# Patient Record
Sex: FEMALE | Race: WHITE | NOT HISPANIC OR LATINO
[De-identification: names, ages, dates, MRNs, and addresses within clinical notes are randomized per-mention and may not be internally consistent; named-entity substitution may affect disease eponyms.]

---

## 2023-01-04 ENCOUNTER — APPOINTMENT (OUTPATIENT)
Dept: OBGYN | Facility: CLINIC | Age: 38
End: 2023-01-04
Payer: COMMERCIAL

## 2023-01-04 VITALS
DIASTOLIC BLOOD PRESSURE: 67 MMHG | WEIGHT: 206 LBS | HEART RATE: 87 BPM | SYSTOLIC BLOOD PRESSURE: 110 MMHG | BODY MASS INDEX: 32.33 KG/M2 | HEIGHT: 67 IN

## 2023-01-04 DIAGNOSIS — Z87.448 PERSONAL HISTORY OF OTHER DISEASES OF URINARY SYSTEM: ICD-10-CM

## 2023-01-04 DIAGNOSIS — Z00.00 ENCOUNTER FOR GENERAL ADULT MEDICAL EXAMINATION W/OUT ABNORMAL FINDINGS: ICD-10-CM

## 2023-01-04 DIAGNOSIS — K70.31 ALCOHOLIC CIRRHOSIS OF LIVER WITH ASCITES: ICD-10-CM

## 2023-01-04 PROCEDURE — 99385 PREV VISIT NEW AGE 18-39: CPT

## 2023-01-04 NOTE — PLAN
[FreeTextEntry1] : Patient is a 37-year-old  6 para 1-0-5-1 last menstrual period 2021\par Patient presents as a first visit after not being seen for almost 5 years and is scheduled for possible liver and kidney transplant.\par Physical exam reveals a well-developed well-nourished female,,, BMI 32\par Heart regular rhythm and rate, lungs clear, breast no mass nontender no skin lesion or nipple discharge no adenopathy, abdomen soft diffuse ascites unable to assess for organomegaly\par Pelvic exam shows normal female external genitalia slightly atrophic, vagina no lesions slightly atrophic, cervix appropriate size nontender, uterus adnexa unable to be assessed secondary to patient's body habitus from the ascites\par Pepcid was performed\par Patient will be scheduled for pelvic sonogram to further evaluate and assess pelvic anatomy.\par Patient does state she was diagnosed with COVID back in ,,, denies any residual symptoms or deficits,,, states she has been vaccinated and boosted\par \par \par Past medical history liver cirrhosis renal,,, ascites\par Menstrual history,,,  x1, cholecystectomy, D&C x3, oral surgery, and peritoneal taps for ascites on a weekly basis\par Allergies,,, iodine\par Medications,,, lactulose, rifaximin, Lexapro, Carafate, serum bicarbonate, potassium,\par Past OB history,,,  x1, TOPV x1,, spontaneous AB x4\par Past GYN history,,, menarche at age 10,,, interval 28, duration 5 days,,, patient has history of birth control pill use, PID, STDs, does have history of positive HPV\par Tobacco,,, denies,\par Alcohol, calm, history of alcoholism,,, alcohol 3 x 1 year\par Drugs,,, marijuana\par Family history,,, mother alive with a history of Hodgkin's disease,,, father alive and well,,, patient denies any family history of breast or ovarian cancer

## 2023-01-04 NOTE — PHYSICAL EXAM
[Chaperone Present] : A chaperone was present in the examining room during all aspects of the physical examination [Appropriately responsive] : appropriately responsive [Alert] : alert [No Acute Distress] : no acute distress [No Lymphadenopathy] : no lymphadenopathy [Regular Rate Rhythm] : regular rate rhythm [No Murmurs] : no murmurs [Clear to Auscultation B/L] : clear to auscultation bilaterally [Oriented x3] : oriented x3 [Examination Of The Breasts] : a normal appearance [No Masses] : no breast masses were palpable [Vulvar Atrophy] : vulvar atrophy [Labia Majora] : normal [Labia Minora] : normal [Atrophy] : atrophy [Normal] : normal [FreeTextEntry7] : Diffuse ascites.,,,,  Unable to assess organs [FreeTextEntry6] : Unable to assess uterus size or adnexa masses due to diffuse ascites

## 2023-01-04 NOTE — HISTORY OF PRESENT ILLNESS
[FreeTextEntry1] : Patient is a 37-year-old  6 para 1-0-5-1 last menstrual period in 2021\par Patient presents for initial visit after not being seen for over 5 years and also for evaluation and clearance for a liver kidney transplant to be scheduled in the near future

## 2023-01-09 ENCOUNTER — APPOINTMENT (OUTPATIENT)
Dept: OBGYN | Facility: CLINIC | Age: 38
End: 2023-01-09
Payer: COMMERCIAL

## 2023-01-09 ENCOUNTER — ASOB RESULT (OUTPATIENT)
Age: 38
End: 2023-01-09

## 2023-01-09 VITALS
HEIGHT: 67 IN | SYSTOLIC BLOOD PRESSURE: 116 MMHG | BODY MASS INDEX: 32.33 KG/M2 | DIASTOLIC BLOOD PRESSURE: 81 MMHG | HEART RATE: 76 BPM | WEIGHT: 206 LBS

## 2023-01-09 PROCEDURE — 76830 TRANSVAGINAL US NON-OB: CPT

## 2023-01-09 PROCEDURE — 99213 OFFICE O/P EST LOW 20 MIN: CPT

## 2023-01-09 PROCEDURE — 93976 VASCULAR STUDY: CPT

## 2023-01-09 NOTE — HISTORY OF PRESENT ILLNESS
[FreeTextEntry1] : Patient is a 37-year-old  6 para 1-0-5-1 last menstrual period 2021\par Patient presents for sonographic duration of anatomy patient is scheduled for possible liver kidney transplant

## 2023-01-09 NOTE — PLAN
[FreeTextEntry1] : Patient is a 37-year-old  6 para 1-0-5-1 last menstrual period t\par Patient presents for evaluation and clearance from a GYN perspective for possible liver kidney transplant\par Patient was seen on  of this year thousand 23 for full exam\par Pelvic sonogram\par Uterus 4.23 x 5.01 x 3.53\par Cervical length 1.9 cm\par Endometrial thickness 3.8 mm\par Right ovary 2.04 x 2.02 x 2.26\par Left ovary 1.89 x 2.19 x 2.7 with a small cyst measuring 0.48 x 0.51 x 0.32 with a normal color Doppler flow no evidence of torsion\par Ascites visualized throughout the pelvis and adnexa and cul-de-sac\par Pelvic anatomy within normal limits and findings discussed with the patient\par Patient cleared from a GYN perspective for any further treatment and surgical procedures\par \par Essentially benign GYN anatomy and findings\par HPV results are positive\par Pap smear results still pending\par Follow-up 1 year or prior to that as needed

## 2023-01-15 LAB
CYTOLOGY CVX/VAG DOC THIN PREP: ABNORMAL
HPV HIGH+LOW RISK DNA PNL CVX: DETECTED

## 2023-01-17 ENCOUNTER — NON-APPOINTMENT (OUTPATIENT)
Age: 38
End: 2023-01-17

## 2024-03-05 ENCOUNTER — APPOINTMENT (OUTPATIENT)
Dept: OBGYN | Facility: CLINIC | Age: 39
End: 2024-03-05
Payer: COMMERCIAL

## 2024-03-05 ENCOUNTER — ASOB RESULT (OUTPATIENT)
Age: 39
End: 2024-03-05

## 2024-03-05 VITALS
WEIGHT: 180 LBS | DIASTOLIC BLOOD PRESSURE: 74 MMHG | SYSTOLIC BLOOD PRESSURE: 101 MMHG | HEART RATE: 89 BPM | BODY MASS INDEX: 28.19 KG/M2

## 2024-03-05 DIAGNOSIS — B97.7 PAPILLOMAVIRUS AS THE CAUSE OF DISEASES CLASSIFIED ELSEWHERE: ICD-10-CM

## 2024-03-05 DIAGNOSIS — Z01.419 ENCOUNTER FOR GYNECOLOGICAL EXAMINATION (GENERAL) (ROUTINE) W/OUT ABNORMAL FINDINGS: ICD-10-CM

## 2024-03-05 DIAGNOSIS — Z12.31 ENCOUNTER FOR SCREENING MAMMOGRAM FOR MALIGNANT NEOPLASM OF BREAST: ICD-10-CM

## 2024-03-05 DIAGNOSIS — R92.30 DENSE BREASTS, UNSPECIFIED: ICD-10-CM

## 2024-03-05 DIAGNOSIS — N92.6 IRREGULAR MENSTRUATION, UNSPECIFIED: ICD-10-CM

## 2024-03-05 DIAGNOSIS — Z12.39 ENCOUNTER FOR OTHER SCREENING FOR MALIGNANT NEOPLASM OF BREAST: ICD-10-CM

## 2024-03-05 DIAGNOSIS — Z12.4 ENCOUNTER FOR SCREENING FOR MALIGNANT NEOPLASM OF CERVIX: ICD-10-CM

## 2024-03-05 DIAGNOSIS — Z11.51 ENCOUNTER FOR SCREENING FOR HUMAN PAPILLOMAVIRUS (HPV): ICD-10-CM

## 2024-03-05 DIAGNOSIS — U07.1 COVID-19: ICD-10-CM

## 2024-03-05 PROCEDURE — 76830 TRANSVAGINAL US NON-OB: CPT

## 2024-03-05 PROCEDURE — 76856 US EXAM PELVIC COMPLETE: CPT | Mod: 59

## 2024-03-05 PROCEDURE — 99396 PREV VISIT EST AGE 40-64: CPT

## 2024-03-08 ENCOUNTER — NON-APPOINTMENT (OUTPATIENT)
Age: 39
End: 2024-03-08

## 2024-03-08 LAB — HPV HIGH+LOW RISK DNA PNL CVX: NOT DETECTED

## 2024-03-14 ENCOUNTER — APPOINTMENT (OUTPATIENT)
Dept: OBGYN | Facility: CLINIC | Age: 39
End: 2024-03-14

## 2024-03-28 DIAGNOSIS — A63.0 ANOGENITAL (VENEREAL) WARTS: ICD-10-CM

## 2024-06-05 ENCOUNTER — OUTPATIENT (OUTPATIENT)
Dept: OUTPATIENT SERVICES | Facility: HOSPITAL | Age: 39
LOS: 1 days | End: 2024-06-05
Payer: COMMERCIAL

## 2024-06-05 VITALS
TEMPERATURE: 97 F | HEIGHT: 67 IN | HEART RATE: 79 BPM | SYSTOLIC BLOOD PRESSURE: 120 MMHG | WEIGHT: 191.58 LBS | OXYGEN SATURATION: 98 % | RESPIRATION RATE: 18 BRPM | DIASTOLIC BLOOD PRESSURE: 90 MMHG

## 2024-06-05 DIAGNOSIS — Z01.818 ENCOUNTER FOR OTHER PREPROCEDURAL EXAMINATION: ICD-10-CM

## 2024-06-05 DIAGNOSIS — Z98.890 OTHER SPECIFIED POSTPROCEDURAL STATES: Chronic | ICD-10-CM

## 2024-06-05 DIAGNOSIS — Z13.89 ENCOUNTER FOR SCREENING FOR OTHER DISORDER: ICD-10-CM

## 2024-06-05 DIAGNOSIS — A63.0 ANOGENITAL (VENEREAL) WARTS: ICD-10-CM

## 2024-06-05 DIAGNOSIS — Z90.49 ACQUIRED ABSENCE OF OTHER SPECIFIED PARTS OF DIGESTIVE TRACT: Chronic | ICD-10-CM

## 2024-06-05 DIAGNOSIS — Z29.9 ENCOUNTER FOR PROPHYLACTIC MEASURES, UNSPECIFIED: ICD-10-CM

## 2024-06-05 DIAGNOSIS — D64.9 ANEMIA, UNSPECIFIED: Chronic | ICD-10-CM

## 2024-06-05 DIAGNOSIS — Z98.891 HISTORY OF UTERINE SCAR FROM PREVIOUS SURGERY: Chronic | ICD-10-CM

## 2024-06-05 DIAGNOSIS — Z94.0 KIDNEY TRANSPLANT STATUS: Chronic | ICD-10-CM

## 2024-06-05 DIAGNOSIS — Z94.4 LIVER TRANSPLANT STATUS: Chronic | ICD-10-CM

## 2024-06-05 LAB
A1C WITH ESTIMATED AVERAGE GLUCOSE RESULT: 4.4 % — SIGNIFICANT CHANGE UP (ref 4–5.6)
ALBUMIN SERPL ELPH-MCNC: 4.4 G/DL — SIGNIFICANT CHANGE UP (ref 3.3–5.2)
ALP SERPL-CCNC: 138 U/L — HIGH (ref 40–120)
ALT FLD-CCNC: <5 U/L — SIGNIFICANT CHANGE UP
ANION GAP SERPL CALC-SCNC: 12 MMOL/L — SIGNIFICANT CHANGE UP (ref 5–17)
ANISOCYTOSIS BLD QL: SLIGHT — SIGNIFICANT CHANGE UP
APTT BLD: 35.3 SEC — SIGNIFICANT CHANGE UP (ref 24.5–35.6)
AST SERPL-CCNC: 12 U/L — SIGNIFICANT CHANGE UP
BASOPHILS # BLD AUTO: 0 K/UL — SIGNIFICANT CHANGE UP (ref 0–0.2)
BASOPHILS NFR BLD AUTO: 0 % — SIGNIFICANT CHANGE UP (ref 0–2)
BILIRUB SERPL-MCNC: 0.4 MG/DL — SIGNIFICANT CHANGE UP (ref 0.4–2)
BLD GP AB SCN SERPL QL: SIGNIFICANT CHANGE UP
BUN SERPL-MCNC: 15.9 MG/DL — SIGNIFICANT CHANGE UP (ref 8–20)
CALCIUM SERPL-MCNC: 8.7 MG/DL — SIGNIFICANT CHANGE UP (ref 8.4–10.5)
CHLORIDE SERPL-SCNC: 105 MMOL/L — SIGNIFICANT CHANGE UP (ref 96–108)
CO2 SERPL-SCNC: 23 MMOL/L — SIGNIFICANT CHANGE UP (ref 22–29)
CREAT SERPL-MCNC: 1.19 MG/DL — SIGNIFICANT CHANGE UP (ref 0.5–1.3)
CRP SERPL-MCNC: 6 MG/L — HIGH
EGFR: 59 ML/MIN/1.73M2 — LOW
ELLIPTOCYTES BLD QL SMEAR: SLIGHT — SIGNIFICANT CHANGE UP
EOSINOPHIL # BLD AUTO: 0 K/UL — SIGNIFICANT CHANGE UP (ref 0–0.5)
EOSINOPHIL NFR BLD AUTO: 0 % — SIGNIFICANT CHANGE UP (ref 0–6)
ESTIMATED AVERAGE GLUCOSE: 80 MG/DL — SIGNIFICANT CHANGE UP (ref 68–114)
FERRITIN SERPL-MCNC: 167 NG/ML — HIGH (ref 15–150)
FOLATE SERPL-MCNC: 7.5 NG/ML — SIGNIFICANT CHANGE UP
GIANT PLATELETS BLD QL SMEAR: PRESENT — SIGNIFICANT CHANGE UP
GLUCOSE SERPL-MCNC: 83 MG/DL — SIGNIFICANT CHANGE UP (ref 70–99)
HCG SERPL-ACNC: <4 MIU/ML — SIGNIFICANT CHANGE UP
HCT VFR BLD CALC: 34.6 % — SIGNIFICANT CHANGE UP (ref 34.5–45)
HGB BLD-MCNC: 11 G/DL — LOW (ref 11.5–15.5)
INR BLD: 1.07 RATIO — SIGNIFICANT CHANGE UP (ref 0.85–1.18)
IRON SATN MFR SERPL: 38 % — SIGNIFICANT CHANGE UP (ref 14–50)
IRON SATN MFR SERPL: 95 UG/DL — SIGNIFICANT CHANGE UP (ref 37–145)
LYMPHOCYTES # BLD AUTO: 0.57 K/UL — LOW (ref 1–3.3)
LYMPHOCYTES # BLD AUTO: 18 % — SIGNIFICANT CHANGE UP (ref 13–44)
MANUAL SMEAR VERIFICATION: SIGNIFICANT CHANGE UP
MCHC RBC-ENTMCNC: 28.9 PG — SIGNIFICANT CHANGE UP (ref 27–34)
MCHC RBC-ENTMCNC: 31.8 GM/DL — LOW (ref 32–36)
MCV RBC AUTO: 91.1 FL — SIGNIFICANT CHANGE UP (ref 80–100)
MICROCYTES BLD QL: SLIGHT — SIGNIFICANT CHANGE UP
MONOCYTES # BLD AUTO: 0.31 K/UL — SIGNIFICANT CHANGE UP (ref 0–0.9)
MONOCYTES NFR BLD AUTO: 9.9 % — SIGNIFICANT CHANGE UP (ref 2–14)
NEUTROPHILS # BLD AUTO: 2.14 K/UL — SIGNIFICANT CHANGE UP (ref 1.8–7.4)
NEUTROPHILS NFR BLD AUTO: 67.6 % — SIGNIFICANT CHANGE UP (ref 43–77)
OVALOCYTES BLD QL SMEAR: SLIGHT — SIGNIFICANT CHANGE UP
PLAT MORPH BLD: NORMAL — SIGNIFICANT CHANGE UP
PLATELET # BLD AUTO: 180 K/UL — SIGNIFICANT CHANGE UP (ref 150–400)
POIKILOCYTOSIS BLD QL AUTO: SLIGHT — SIGNIFICANT CHANGE UP
POLYCHROMASIA BLD QL SMEAR: SLIGHT — SIGNIFICANT CHANGE UP
POTASSIUM SERPL-MCNC: 4.3 MMOL/L — SIGNIFICANT CHANGE UP (ref 3.5–5.3)
POTASSIUM SERPL-SCNC: 4.3 MMOL/L — SIGNIFICANT CHANGE UP (ref 3.5–5.3)
PROT SERPL-MCNC: 6.5 G/DL — LOW (ref 6.6–8.7)
PROTHROM AB SERPL-ACNC: 11.9 SEC — SIGNIFICANT CHANGE UP (ref 9.5–13)
RBC # BLD: 3.8 M/UL — SIGNIFICANT CHANGE UP (ref 3.8–5.2)
RBC # FLD: 15.6 % — HIGH (ref 10.3–14.5)
RBC BLD AUTO: ABNORMAL
RETICS #: 64.8 K/UL — SIGNIFICANT CHANGE UP (ref 25–125)
RETICS/RBC NFR: 1.7 % — SIGNIFICANT CHANGE UP (ref 0.5–2.5)
SMUDGE CELLS # BLD: PRESENT — SIGNIFICANT CHANGE UP
SODIUM SERPL-SCNC: 140 MMOL/L — SIGNIFICANT CHANGE UP (ref 135–145)
TIBC SERPL-MCNC: 249 UG/DL — SIGNIFICANT CHANGE UP (ref 220–430)
TRANSFERRIN SERPL-MCNC: 174 MG/DL — LOW (ref 192–382)
VARIANT LYMPHS # BLD: 4.5 % — SIGNIFICANT CHANGE UP (ref 0–6)
VIT B12 SERPL-MCNC: 1267 PG/ML — HIGH (ref 232–1245)
WBC # BLD: 3.16 K/UL — LOW (ref 3.8–10.5)
WBC # FLD AUTO: 3.16 K/UL — LOW (ref 3.8–10.5)

## 2024-06-05 PROCEDURE — 83001 ASSAY OF GONADOTROPIN (FSH): CPT

## 2024-06-05 PROCEDURE — G0463: CPT

## 2024-06-05 PROCEDURE — 83540 ASSAY OF IRON: CPT

## 2024-06-05 PROCEDURE — 84466 ASSAY OF TRANSFERRIN: CPT

## 2024-06-05 PROCEDURE — 86140 C-REACTIVE PROTEIN: CPT

## 2024-06-05 PROCEDURE — 82670 ASSAY OF TOTAL ESTRADIOL: CPT

## 2024-06-05 PROCEDURE — 85610 PROTHROMBIN TIME: CPT

## 2024-06-05 PROCEDURE — 83036 HEMOGLOBIN GLYCOSYLATED A1C: CPT

## 2024-06-05 PROCEDURE — 86900 BLOOD TYPING SEROLOGIC ABO: CPT

## 2024-06-05 PROCEDURE — 82607 VITAMIN B-12: CPT

## 2024-06-05 PROCEDURE — 85730 THROMBOPLASTIN TIME PARTIAL: CPT

## 2024-06-05 PROCEDURE — 93010 ELECTROCARDIOGRAM REPORT: CPT

## 2024-06-05 PROCEDURE — 36415 COLL VENOUS BLD VENIPUNCTURE: CPT

## 2024-06-05 PROCEDURE — 83002 ASSAY OF GONADOTROPIN (LH): CPT

## 2024-06-05 PROCEDURE — 93005 ELECTROCARDIOGRAM TRACING: CPT

## 2024-06-05 PROCEDURE — 85025 COMPLETE CBC W/AUTO DIFF WBC: CPT

## 2024-06-05 PROCEDURE — 86901 BLOOD TYPING SEROLOGIC RH(D): CPT

## 2024-06-05 PROCEDURE — 82746 ASSAY OF FOLIC ACID SERUM: CPT

## 2024-06-05 PROCEDURE — 83550 IRON BINDING TEST: CPT

## 2024-06-05 PROCEDURE — 86850 RBC ANTIBODY SCREEN: CPT

## 2024-06-05 PROCEDURE — 82728 ASSAY OF FERRITIN: CPT

## 2024-06-05 PROCEDURE — 84702 CHORIONIC GONADOTROPIN TEST: CPT

## 2024-06-05 PROCEDURE — 85045 AUTOMATED RETICULOCYTE COUNT: CPT

## 2024-06-05 PROCEDURE — 80053 COMPREHEN METABOLIC PANEL: CPT

## 2024-06-05 NOTE — H&P PST ADULT - ASSESSMENT
38 yo F   x1 LMP 2024 prior to that it was 2023 before that it was 2021, presents with c/o irregular menstrual bleeding and evaluation of a vulvar mass. Patient has history of liver and kidney transplant with unfortunately failure of the kidney and now is back on dialysis and awaiting new liver to be transplanted. Preop assessment prior to excision of vulvar mass w/Dr Rodriguez scheduled for 2024, pending medical clearance    Written and oral ERP instructions given, pt verbalized understanding.     Patient was educated on preop preparation with written and verbal instructions. Pt was informed to obtain clearances >3 days before surgery. Pt will review medications with PCP. Pt was educated on NSAIDs, multivitamins and herbals that increase the risk of bleeding and need to be stopped 7 days before procedure. Pt verbalized understanding of the above.     OPIOID RISK TOOL    GABI EACH BOX THAT APPLIES AND ADD TOTALS AT THE END    FAMILY HISTORY OF SUBSTANCE ABUSE                 FEMALE         MALE                                                Alcohol                             [  ]1 pt          [  ]3pts                                               Illegal Durgs                     [  ]2 pts        [  ]3pts                                               Rx Drugs                           [  ]4 pts        [  ]4 pts    PERSONAL HISTORY OF SUBSTANCE ABUSE                                                                                          Alcohol                             [x  ]3 pts       [  ]3 pts                                               Illegal Drugs                     [  ]4 pts        [  ]4 pts                                               Rx Drugs                           [  ]5 pts        [  ]5 pts    AGE BETWEEN 16-45 YEARS                                      [ x ]1 pt         [  ]1 pt    HISTORY OF PREADOLESCENT   SEXUAL ABUSE                                                             [  ]3 pts        [  ]0pts    PSYCHOLOGICAL DISEASE                     ADD, OCD, Bipolar, Schizophrenia        [  ]2 pts         [  ]2 pts                      Depression                                               [ x ]1 pt           [  ]1 pt           SCORING TOTAL   (add numbers and type here)              ( 5 )                                     A score of 3 or lower indicated LOW risk for future opioid abuse  A score of 4 to 7 indicated moderate risk for future opioid abuse  A score of 8 or higher indicates a high risk for opioid abuse    CAPRINI VTE 2.0 SCORE [CLOT updated 2019]    AGE RELATED RISK FACTORS                                                       MOBILITY RELATED FACTORS  [ ] Age 41-60 years                                            (1 Point)                    [ ] Bed rest                                                        (1 Point)  [ ] Age: 61-74 years                                           (2 Points)                  [ ] Plaster cast                                                   (2 Points)  [ ] Age= 75 years                                              (3 Points)                    [ ] Bed bound for more than 72 hours                 (2 Points)    DISEASE RELATED RISK FACTORS                                               GENDER SPECIFIC FACTORS  [ ] Edema in the lower extremities                       (1 Point)              [ ] Pregnancy                                                     (1 Point)  [ ] Varicose veins                                               (1 Point)                     [ ] Post-partum < 6 weeks                                   (1 Point)             [x ] BMI > 25 Kg/m2                                            (1 Point)                     [ ] Hormonal therapy  or oral contraception          (1 Point)                 [ ] Sepsis (in the previous month)                        (1 Point)               [ ] History of pregnancy complications                 (1 point)  [ ] Pneumonia or serious lung disease                                               [ ] Unexplained or recurrent                     (1 Point)           (in the previous month)                               (1 Point)  [ ] Abnormal pulmonary function test                     (1 Point)                 SURGERY RELATED RISK FACTORS  [ ] Acute myocardial infarction                              (1 Point)               [ ]  Section                                             (1 Point)  [ ] Congestive heart failure (in the previous month)  (1 Point)      [ ] Minor surgery                                                  (1 Point)   [ ] Inflammatory bowel disease                             (1 Point)               [ ] Arthroscopic surgery                                        (2 Points)  [ ] Central venous access                                      (2 Points)                [x ] General surgery lasting more than 45 minutes (2 points)  [ ] Malignancy- Present or previous                   (2 Points)                [ ] Elective arthroplasty                                         (5 points)    [ ] Stroke (in the previous month)                          (5 Points)                                                                                                                                                           HEMATOLOGY RELATED FACTORS                                                 TRAUMA RELATED RISK FACTORS  [ ] Prior episodes of VTE                                     (3 Points)                [ ] Fracture of the hip, pelvis, or leg                       (5 Points)  [ ] Positive family history for VTE                         (3 Points)             [ ] Acute spinal cord injury (in the previous month)  (5 Points)  [ ] Prothrombin 97620 A                                     (3 Points)               [ ] Paralysis  (less than 1 month)                             (5 Points)  [ ] Factor V Leiden                                             (3 Points)                  [ ] Multiple Trauma within 1 month                        (5 Points)  [ ] Lupus anticoagulants                                     (3 Points)                                                           [ ] Anticardiolipin antibodies                               (3 Points)                                                       [ ] High homocysteine in the blood                      (3 Points)                                             [ ] Other congenital or acquired thrombophilia      (3 Points)                                                [ ] Heparin induced thrombocytopenia                  (3 Points)                                     Total Score [    3      ] 40 yo F   x1 LMP in late May 2024, PMH of IBS, anemia, anxiety, presents with c/o irregular menstrual bleeding and a vulvar mass. Patient has Hx of liver transplant in 2023 and 2 kidney transplants, most recent done 3/23/2024. Preop assessment prior to excision of vulvar mass w/Dr Rodriguez scheduled for 2024, pending medical clearance    Written and oral ERP instructions given, pt verbalized understanding.     Patient was educated on preop preparation with written and verbal instructions. Pt was informed to obtain clearances >3 days before surgery. Pt will review medications with PCP. Pt was educated on NSAIDs, multivitamins and herbals that increase the risk of bleeding and need to be stopped 7 days before procedure. Pt verbalized understanding of the above.     OPIOID RISK TOOL    GABI EACH BOX THAT APPLIES AND ADD TOTALS AT THE END    FAMILY HISTORY OF SUBSTANCE ABUSE                 FEMALE         MALE                                                Alcohol                             [  ]1 pt          [  ]3pts                                               Illegal Drugs                     [  ]2 pts        [  ]3pts                                               Rx Drugs                           [  ]4 pts        [  ]4 pts    PERSONAL HISTORY OF SUBSTANCE ABUSE                                                                                          Alcohol                             [x  ]3 pts       [  ]3 pts                                               Illegal Drugs                     [  ]4 pts        [  ]4 pts                                               Rx Drugs                           [  ]5 pts        [  ]5 pts    AGE BETWEEN 16-45 YEARS                                      [ x ]1 pt         [  ]1 pt    HISTORY OF PREADOLESCENT   SEXUAL ABUSE                                                             [  ]3 pts        [  ]0pts    PSYCHOLOGICAL DISEASE                     ADD, OCD, Bipolar, Schizophrenia        [  ]2 pts         [  ]2 pts                      Depression                                               [ x ]1 pt           [  ]1 pt           SCORING TOTAL   (add numbers and type here)              ( 5 )                                     A score of 3 or lower indicated LOW risk for future opioid abuse  A score of 4 to 7 indicated moderate risk for future opioid abuse  A score of 8 or higher indicates a high risk for opioid abuse    ORACIOI VTE 2.0 SCORE [CLOT updated 2019]    AGE RELATED RISK FACTORS                                                       MOBILITY RELATED FACTORS  [ ] Age 41-60 years                                            (1 Point)                    [ ] Bed rest                                                        (1 Point)  [ ] Age: 61-74 years                                           (2 Points)                  [ ] Plaster cast                                                   (2 Points)  [ ] Age= 75 years                                              (3 Points)                    [ ] Bed bound for more than 72 hours                 (2 Points)    DISEASE RELATED RISK FACTORS                                               GENDER SPECIFIC FACTORS  [ ] Edema in the lower extremities                       (1 Point)              [ ] Pregnancy                                                     (1 Point)  [ ] Varicose veins                                               (1 Point)                     [ ] Post-partum < 6 weeks                                   (1 Point)             [x ] BMI > 25 Kg/m2                                            (1 Point)                     [ ] Hormonal therapy  or oral contraception          (1 Point)                 [ ] Sepsis (in the previous month)                        (1 Point)               [ ] History of pregnancy complications                 (1 point)  [ ] Pneumonia or serious lung disease                                               [ ] Unexplained or recurrent                     (1 Point)           (in the previous month)                               (1 Point)  [ ] Abnormal pulmonary function test                     (1 Point)                 SURGERY RELATED RISK FACTORS  [ ] Acute myocardial infarction                              (1 Point)               [ ]  Section                                             (1 Point)  [ ] Congestive heart failure (in the previous month)  (1 Point)      [ ] Minor surgery                                                  (1 Point)   [ ] Inflammatory bowel disease                             (1 Point)               [ ] Arthroscopic surgery                                        (2 Points)  [ ] Central venous access                                      (2 Points)                [x ] General surgery lasting more than 45 minutes (2 points)  [ ] Malignancy- Present or previous                   (2 Points)                [ ] Elective arthroplasty                                         (5 points)    [ ] Stroke (in the previous month)                          (5 Points)                                                                                                                                                           HEMATOLOGY RELATED FACTORS                                                 TRAUMA RELATED RISK FACTORS  [ ] Prior episodes of VTE                                     (3 Points)                [ ] Fracture of the hip, pelvis, or leg                       (5 Points)  [ ] Positive family history for VTE                         (3 Points)             [ ] Acute spinal cord injury (in the previous month)  (5 Points)  [ ] Prothrombin 56855 A                                     (3 Points)               [ ] Paralysis  (less than 1 month)                             (5 Points)  [ ] Factor V Leiden                                             (3 Points)                  [ ] Multiple Trauma within 1 month                        (5 Points)  [ ] Lupus anticoagulants                                     (3 Points)                                                           [ ] Anticardiolipin antibodies                               (3 Points)                                                       [ ] High homocysteine in the blood                      (3 Points)                                             [ ] Other congenital or acquired thrombophilia      (3 Points)                                                [ ] Heparin induced thrombocytopenia                  (3 Points)                                     Total Score [    3      ] 40 yo F   x1 LMP in late May 2024, PMH of IBS, anemia, anxiety, presents with c/o irregular menstrual bleeding and a vulvar mass. Patient has Hx of liver transplant in 2023 and 2 kidney transplants, most recent done 3/23/2024. She denies fevers, chills, pain, any bowel or bladder issues. Preop assessment prior to excision of vulvar mass w/Dr Rodriguez scheduled for 2024, pending medical clearance    Written and oral ERP instructions given, pt verbalized understanding.     Patient was educated on preop preparation with written and verbal instructions. Pt was informed to obtain clearances >3 days before surgery. Pt was educated on NSAIDs, multivitamins and herbals that increase the risk of bleeding and need to be stopped 7 days before procedure. Pt verbalized understanding of the above.     OPIOID RISK TOOL    GABI EACH BOX THAT APPLIES AND ADD TOTALS AT THE END    FAMILY HISTORY OF SUBSTANCE ABUSE                 FEMALE         MALE                                                Alcohol                             [  ]1 pt          [  ]3pts                                               Illegal Drugs                     [  ]2 pts        [  ]3pts                                               Rx Drugs                           [  ]4 pts        [  ]4 pts    PERSONAL HISTORY OF SUBSTANCE ABUSE                                                                                          Alcohol                             [x  ]3 pts       [  ]3 pts                                               Illegal Drugs                     [  ]4 pts        [  ]4 pts                                               Rx Drugs                           [  ]5 pts        [  ]5 pts    AGE BETWEEN 16-45 YEARS                                      [ x ]1 pt         [  ]1 pt    HISTORY OF PREADOLESCENT   SEXUAL ABUSE                                                             [  ]3 pts        [  ]0pts    PSYCHOLOGICAL DISEASE                     ADD, OCD, Bipolar, Schizophrenia        [  ]2 pts         [  ]2 pts                      Depression                                               [ x ]1 pt           [  ]1 pt           SCORING TOTAL   (add numbers and type here)              ( 5 )                                     A score of 3 or lower indicated LOW risk for future opioid abuse  A score of 4 to 7 indicated moderate risk for future opioid abuse  A score of 8 or higher indicates a high risk for opioid abuse    CAPRINI VTE 2.0 SCORE [CLOT updated 2019]    AGE RELATED RISK FACTORS                                                       MOBILITY RELATED FACTORS  [ ] Age 41-60 years                                            (1 Point)                    [ ] Bed rest                                                        (1 Point)  [ ] Age: 61-74 years                                           (2 Points)                  [ ] Plaster cast                                                   (2 Points)  [ ] Age= 75 years                                              (3 Points)                    [ ] Bed bound for more than 72 hours                 (2 Points)    DISEASE RELATED RISK FACTORS                                               GENDER SPECIFIC FACTORS  [ ] Edema in the lower extremities                       (1 Point)              [ ] Pregnancy                                                     (1 Point)  [ ] Varicose veins                                               (1 Point)                     [ ] Post-partum < 6 weeks                                   (1 Point)             [x ] BMI > 25 Kg/m2                                            (1 Point)                     [ ] Hormonal therapy  or oral contraception          (1 Point)                 [ ] Sepsis (in the previous month)                        (1 Point)               [ ] History of pregnancy complications                 (1 point)  [ ] Pneumonia or serious lung disease                                               [ ] Unexplained or recurrent                     (1 Point)           (in the previous month)                               (1 Point)  [ ] Abnormal pulmonary function test                     (1 Point)                 SURGERY RELATED RISK FACTORS  [ ] Acute myocardial infarction                              (1 Point)               [ ]  Section                                             (1 Point)  [ ] Congestive heart failure (in the previous month)  (1 Point)      [ ] Minor surgery                                                  (1 Point)   [ ] Inflammatory bowel disease                             (1 Point)               [ ] Arthroscopic surgery                                        (2 Points)  [ ] Central venous access                                      (2 Points)                [x ] General surgery lasting more than 45 minutes (2 points)  [ ] Malignancy- Present or previous                   (2 Points)                [ ] Elective arthroplasty                                         (5 points)    [ ] Stroke (in the previous month)                          (5 Points)                                                                                                                                                           HEMATOLOGY RELATED FACTORS                                                 TRAUMA RELATED RISK FACTORS  [ ] Prior episodes of VTE                                     (3 Points)                [ ] Fracture of the hip, pelvis, or leg                       (5 Points)  [ ] Positive family history for VTE                         (3 Points)             [ ] Acute spinal cord injury (in the previous month)  (5 Points)  [ ] Prothrombin 79016 A                                     (3 Points)               [ ] Paralysis  (less than 1 month)                             (5 Points)  [ ] Factor V Leiden                                             (3 Points)                  [ ] Multiple Trauma within 1 month                        (5 Points)  [ ] Lupus anticoagulants                                     (3 Points)                                                           [ ] Anticardiolipin antibodies                               (3 Points)                                                       [ ] High homocysteine in the blood                      (3 Points)                                             [ ] Other congenital or acquired thrombophilia      (3 Points)                                                [ ] Heparin induced thrombocytopenia                  (3 Points)                                     Total Score [    3      ]

## 2024-06-05 NOTE — H&P PST ADULT - PROBLEM SELECTOR PLAN 1
preop assessment, excision of vulvar mass w/Dr Rodriguez scheduled for 6/12/2024, pending medical clearance

## 2024-06-05 NOTE — H&P PST ADULT - ATTENDING COMMENTS
Patient seen and chart reviewed  Procedure discussed and consent obtained  Questions answered and instructions given  Vulva condyloma,,, Irregular bleeding  Fx D+C, Condyloma excision possible laser  No change in patient status

## 2024-06-05 NOTE — H&P PST ADULT - NSICDXPASTSURGICALHX_GEN_ALL_CORE_FT
PAST SURGICAL HISTORY:  H/O  section     H/O kidney transplant     History of D&C     S/P liver transplant      PAST SURGICAL HISTORY:  H/O  section     H/O kidney transplant     History of cholecystectomy     History of D&C     S/P liver transplant

## 2024-06-05 NOTE — H&P PST ADULT - NSICDXFAMILYHX_GEN_ALL_CORE_FT
FAMILY HISTORY:  Mother  Still living? Unknown  FH: Hodgkins disease, Age at diagnosis: Age Unknown

## 2024-06-05 NOTE — H&P PST ADULT - NSHP PST DIAGOTHER LIST_GEN_A_CORE
6/5/2024: abnormal labs reported to Dr Rodriguez and faxed to PCP Dr Vargas. Stephanie Montgomery NP

## 2024-06-05 NOTE — H&P PST ADULT - NSICDXPASTMEDICALHX_GEN_ALL_CORE_FT
PAST MEDICAL HISTORY:  Anemia     Anogenital (venereal) warts     H/O kidney transplant     History of cirrhosis of liver      PAST MEDICAL HISTORY:  Anemia     Anogenital (venereal) warts     H/O ETOH abuse     H/O kidney transplant     History of cirrhosis of liver

## 2024-06-05 NOTE — H&P PST ADULT - HISTORY OF PRESENT ILLNESS
38 yo F   x1 LMP 2024 prior to that it was 2023 before that it was 2021, presents with c/o irregular menstrual bleeding and evaluation of a vulvar mass. Patient has history of liver and kidney transplant with unfortunately failure of the kidney and now is back on dialysis and awaiting new liver to be transplanted. Preop assessment prior to excision of vulvar mass w/Dr Rodriguez scheduled for 2024, pending medical clearance 38 yo F   x1 LMP in late May 2024, PMH of IBS, anemia, anxiety, presents with c/o irregular menstrual bleeding and a vulvar mass. Patient has Hx of liver transplant in 2023 and 2 kidney transplants, most recent done 3/23/2024. Preop assessment prior to excision of vulvar mass w/Dr Rodriguez scheduled for 2024, pending medical and nephrology clearances 40 yo F   x1 LMP in late May 2024, PMH of IBS, anemia, anxiety, presents with c/o irregular menstrual bleeding and a vulvar mass. Patient has Hx of liver transplant in 2023 and 2 kidney transplants, most recent done 3/23/2024. She denies fevers, chills, pain, any bowel or bladder issues. Preop assessment prior to excision of vulvar mass w/Dr Rodriguez scheduled for 2024, pending medical clearance

## 2024-06-06 LAB
BASOPHILS # BLD AUTO: 0.01 K/UL — SIGNIFICANT CHANGE UP (ref 0–0.2)
BASOPHILS NFR BLD AUTO: 0.3 % — SIGNIFICANT CHANGE UP (ref 0–2)
EOSINOPHIL # BLD AUTO: 0.04 K/UL — SIGNIFICANT CHANGE UP (ref 0–0.5)
EOSINOPHIL NFR BLD AUTO: 1.3 % — SIGNIFICANT CHANGE UP (ref 0–6)
ESTRADIOL FREE SERPL-MCNC: 148 PG/ML — SIGNIFICANT CHANGE UP
FSH SERPL-MCNC: 4.7 IU/L — SIGNIFICANT CHANGE UP
HCT VFR BLD CALC: 34.6 % — SIGNIFICANT CHANGE UP (ref 34.5–45)
HGB BLD-MCNC: 10.7 G/DL — LOW (ref 11.5–15.5)
IMM GRANULOCYTES NFR BLD AUTO: 1.3 % — HIGH (ref 0–0.9)
IMM GRANULOCYTES NFR BLD AUTO: 1.3 % — HIGH (ref 0–0.9)
LH SERPL-ACNC: 17 IU/L — SIGNIFICANT CHANGE UP
LYMPHOCYTES # BLD AUTO: 0.86 K/UL — LOW (ref 1–3.3)
LYMPHOCYTES # BLD AUTO: 27 % — SIGNIFICANT CHANGE UP (ref 13–44)
MCHC RBC-ENTMCNC: 28.7 PG — SIGNIFICANT CHANGE UP (ref 27–34)
MCHC RBC-ENTMCNC: 30.9 GM/DL — LOW (ref 32–36)
MCV RBC AUTO: 92.8 FL — SIGNIFICANT CHANGE UP (ref 80–100)
MONOCYTES # BLD AUTO: 0.18 K/UL — SIGNIFICANT CHANGE UP (ref 0–0.9)
MONOCYTES NFR BLD AUTO: 5.7 % — SIGNIFICANT CHANGE UP (ref 2–14)
NEUTROPHILS # BLD AUTO: 2.05 K/UL — SIGNIFICANT CHANGE UP (ref 1.8–7.4)
NEUTROPHILS NFR BLD AUTO: 64.4 % — SIGNIFICANT CHANGE UP (ref 43–77)
PLATELET # BLD AUTO: 167 K/UL — SIGNIFICANT CHANGE UP (ref 150–400)
RBC # BLD: 3.73 M/UL — LOW (ref 3.8–5.2)
RBC # BLD: 3.73 M/UL — LOW (ref 3.8–5.2)
RBC # FLD: 15.7 % — HIGH (ref 10.3–14.5)
WBC # BLD: 3.18 K/UL — LOW (ref 3.8–10.5)
WBC # FLD AUTO: 3.18 K/UL — LOW (ref 3.8–10.5)

## 2024-06-11 ENCOUNTER — TRANSCRIPTION ENCOUNTER (OUTPATIENT)
Age: 39
End: 2024-06-11

## 2024-06-11 NOTE — HISTORY OF PRESENT ILLNESS
[FreeTextEntry1] : Patient is a 39-year-old  6 para 1-0-5-1 last menstrual period 2024 prior to that it was 2023 before that it was 2021 Patient presents for annual visit,, denies any complaints other than this irregular bleeding and evaluation of a vulvar mass Patient has history of liver and kidney transplant with unfortunately failure of the kidney and now is back on dialysis and awaiting new kidney to be transplanted

## 2024-06-11 NOTE — PLAN
[FreeTextEntry1] : Patient is a 39-year-old  6 para 1-0-5-1 last menstrual period 2024 Prior cycle was 2023 and prior to that it was 2021 Presents for annual visit,, and evaluation of this irregular bleeding and evaluation of a vulvar mass Physical exam reveals a well-developed well-nourished female no apparent distress,, BMI 28 Subclavian port in place for dialysis use Heart regular rhythm and rate, lungs clear, breast no mass nontender no skin change no nipple discharge adenopathy, abdomen soft nontender no organomegaly evidence of scarring from the renal transplant Pelvic exam shows normal female external genitalia evidence of 2 large condyloma on the right labia minora 1 approximately 2-1/2 cm the other 1-1/2 cm fungating cauliflower type lesion,, cervix appropriate size nontender, uterus anteverted normal size nontender, adnexa no mass nontender Pap was performed Patient will be sent for blood work for FSH, LH, estradiol levels,, patient underwent a pelvic sonogram Uterus 5.76 x 6.05 x 4.45 Endometrial thickness 7.4 mm Right ovary 3.48 x 2.86 x 2.13 Left ovary 3.46 x 2.95 x 2.55 with a simple cyst measuring 1.66 x 0.85 x 1.36 with a normal color Doppler flow No Nexa masses No free fluid Results discussed with patient Patient to return for excision of the vulva condyloma Depending on the FSH and estradiol levels patient may need a endometrial sampling Patient will receive a GYN clearance letter for her next planned// scheduled renal transplant  Jacque was present as a chaperone for the entire assessment examinations patient and after the sono was performed patient was brought to the office consultation for discussion of the results

## 2024-06-11 NOTE — PHYSICAL EXAM
[Chaperone Present] : A chaperone was present in the examining room during all aspects of the physical examination [Appropriately responsive] : appropriately responsive [No Acute Distress] : no acute distress [Alert] : alert [No Lymphadenopathy] : no lymphadenopathy [Regular Rate Rhythm] : regular rate rhythm [Clear to Auscultation B/L] : clear to auscultation bilaterally [No Murmurs] : no murmurs [Soft] : soft [Non-tender] : non-tender [Non-distended] : non-distended [No Lesions] : no lesions [No Mass] : no mass [Oriented x3] : oriented x3 [Examination Of The Breasts] : a normal appearance [No Masses] : no breast masses were palpable [Labia Majora] : normal [Labia Minora] : normal [Normal] : normal [Anteversion] : anteverted [Uterine Adnexae] : normal [FreeTextEntry6] : No masses, nontender, no skin changes, no nipple discharge, no adenopathy. [Tenderness] : nontender [Mass ___ cm] : no uterine mass was palpated [FreeTextEntry4] : Evidence of 2 condyloma 1 approximately 3 cm the other approximately 1-1/2 cm on the right labia minora

## 2024-06-12 ENCOUNTER — RESULT REVIEW (OUTPATIENT)
Age: 39
End: 2024-06-12

## 2024-06-12 ENCOUNTER — TRANSCRIPTION ENCOUNTER (OUTPATIENT)
Age: 39
End: 2024-06-12

## 2024-06-12 ENCOUNTER — OUTPATIENT (OUTPATIENT)
Dept: OUTPATIENT SERVICES | Facility: HOSPITAL | Age: 39
LOS: 1 days | End: 2024-06-12
Payer: COMMERCIAL

## 2024-06-12 ENCOUNTER — APPOINTMENT (OUTPATIENT)
Dept: OBGYN | Facility: HOSPITAL | Age: 39
End: 2024-06-12

## 2024-06-12 VITALS
SYSTOLIC BLOOD PRESSURE: 111 MMHG | HEART RATE: 74 BPM | WEIGHT: 191.58 LBS | DIASTOLIC BLOOD PRESSURE: 78 MMHG | HEIGHT: 67 IN | TEMPERATURE: 98 F | RESPIRATION RATE: 18 BRPM | OXYGEN SATURATION: 97 %

## 2024-06-12 VITALS
OXYGEN SATURATION: 100 % | HEART RATE: 70 BPM | RESPIRATION RATE: 16 BRPM | SYSTOLIC BLOOD PRESSURE: 118 MMHG | DIASTOLIC BLOOD PRESSURE: 73 MMHG

## 2024-06-12 DIAGNOSIS — Z94.0 KIDNEY TRANSPLANT STATUS: Chronic | ICD-10-CM

## 2024-06-12 DIAGNOSIS — Z90.49 ACQUIRED ABSENCE OF OTHER SPECIFIED PARTS OF DIGESTIVE TRACT: Chronic | ICD-10-CM

## 2024-06-12 DIAGNOSIS — Z98.891 HISTORY OF UTERINE SCAR FROM PREVIOUS SURGERY: Chronic | ICD-10-CM

## 2024-06-12 DIAGNOSIS — A63.0 ANOGENITAL (VENEREAL) WARTS: ICD-10-CM

## 2024-06-12 DIAGNOSIS — Z98.890 OTHER SPECIFIED POSTPROCEDURAL STATES: Chronic | ICD-10-CM

## 2024-06-12 DIAGNOSIS — Z94.4 LIVER TRANSPLANT STATUS: Chronic | ICD-10-CM

## 2024-06-12 PROCEDURE — 88305 TISSUE EXAM BY PATHOLOGIST: CPT

## 2024-06-12 PROCEDURE — 11421 EXC H-F-NK-SP B9+MARG 0.6-1: CPT

## 2024-06-12 PROCEDURE — 88305 TISSUE EXAM BY PATHOLOGIST: CPT | Mod: 26

## 2024-06-12 PROCEDURE — 11200 RMVL SKIN TAGS UP TO&INC 15: CPT

## 2024-06-12 PROCEDURE — 58120 DILATION AND CURETTAGE: CPT

## 2024-06-12 PROCEDURE — 56501 DESTROY VULVA LESIONS SIM: CPT

## 2024-06-12 RX ORDER — QUETIAPINE FUMARATE 200 MG/1
1 TABLET, FILM COATED ORAL
Refills: 0 | DISCHARGE

## 2024-06-12 RX ORDER — HYDROMORPHONE HYDROCHLORIDE 2 MG/ML
0.5 INJECTION INTRAMUSCULAR; INTRAVENOUS; SUBCUTANEOUS
Refills: 0 | Status: DISCONTINUED | OUTPATIENT
Start: 2024-06-12 | End: 2024-06-12

## 2024-06-12 RX ORDER — MYCOPHENOLATE MOFETIL 250 MG/1
2 CAPSULE ORAL
Refills: 0 | DISCHARGE

## 2024-06-12 RX ORDER — ACETAMINOPHEN 500 MG
1000 TABLET ORAL ONCE
Refills: 0 | Status: COMPLETED | OUTPATIENT
Start: 2024-06-12 | End: 2024-06-12

## 2024-06-12 RX ORDER — PANTOPRAZOLE SODIUM 20 MG/1
1 TABLET, DELAYED RELEASE ORAL
Refills: 0 | DISCHARGE

## 2024-06-12 RX ORDER — VALGANCICLOVIR 450 MG/1
0 TABLET, FILM COATED ORAL
Refills: 0 | DISCHARGE

## 2024-06-12 RX ORDER — CEFAZOLIN SODIUM 1 G
2000 VIAL (EA) INJECTION ONCE
Refills: 0 | Status: ACTIVE | OUTPATIENT
Start: 2024-06-12

## 2024-06-12 RX ORDER — ONDANSETRON 8 MG/1
4 TABLET, FILM COATED ORAL ONCE
Refills: 0 | Status: DISCONTINUED | OUTPATIENT
Start: 2024-06-12 | End: 2024-06-12

## 2024-06-12 RX ORDER — ESCITALOPRAM OXALATE 10 MG/1
1.5 TABLET, FILM COATED ORAL
Refills: 0 | DISCHARGE

## 2024-06-12 RX ORDER — SODIUM CHLORIDE 9 MG/ML
3 INJECTION INTRAMUSCULAR; INTRAVENOUS; SUBCUTANEOUS ONCE
Refills: 0 | Status: ACTIVE | OUTPATIENT
Start: 2024-06-12

## 2024-06-12 RX ORDER — FENTANYL CITRATE 50 UG/ML
25 INJECTION INTRAVENOUS
Refills: 0 | Status: DISCONTINUED | OUTPATIENT
Start: 2024-06-12 | End: 2024-06-12

## 2024-06-12 RX ADMIN — Medication 1000 MILLIGRAM(S): at 13:05

## 2024-06-12 RX ADMIN — Medication 400 MILLIGRAM(S): at 12:25

## 2024-06-12 NOTE — ASU PREOP CHECKLIST - AS TEMP SITE
Detail Level: Detailed Quality 226: Preventive Care And Screening: Tobacco Use: Screening And Cessation Intervention: Patient screened for tobacco use and is an ex/non-smoker Quality 431: Preventive Care And Screening: Unhealthy Alcohol Use - Screening: Patient screened for unhealthy alcohol use using a single question and scores less than 2 times per year Quality 130: Documentation Of Current Medications In The Medical Record: Current Medications Documented forehead

## 2024-06-12 NOTE — ASU DISCHARGE PLAN (ADULT/PEDIATRIC) - CARE PROVIDER_API CALL
Clarice Rodriguez  Obstetrics and Gynecology  [address] [place] [id number]  Phone: [phone]  Fax: [phone]  Follow Up Time:

## 2024-06-12 NOTE — ASU DISCHARGE PLAN (ADULT/PEDIATRIC) - NS MD DC FALL RISK RISK
For information on Fall & Injury Prevention, visit: [email or web address] OR  [email or web address] OR  https://www.cdc.gov/steadi/patient.html

## 2024-06-12 NOTE — BRIEF OPERATIVE NOTE - OPERATION/FINDINGS
External genitalia with 4 vulvar condyloma, 2 right periurethral, largest [id number] and 1cm. One 0.5cm on right side of lower mons, one 0.5cm on posterior right labia  Periurethral lesions removed with laser fulguration, labial and mons lesions removed with sharp excision   Good hemostasis obtained   Bimanual exam with 8wk sized uterus, sounded to 9cm.   D&C with [facility] and [facility] collected

## 2024-06-14 LAB — SURGICAL PATHOLOGY STUDY: SIGNIFICANT CHANGE UP

## 2024-07-02 PROBLEM — F10.11 ALCOHOL ABUSE, IN REMISSION: Chronic | Status: ACTIVE | Noted: 2024-06-05

## 2024-07-02 PROBLEM — Z94.0 KIDNEY TRANSPLANT STATUS: Chronic | Status: ACTIVE | Noted: 2024-06-05

## 2024-07-02 PROBLEM — A63.0 ANOGENITAL (VENEREAL) WARTS: Chronic | Status: ACTIVE | Noted: 2024-06-05

## 2024-07-02 PROBLEM — Z87.19 PERSONAL HISTORY OF OTHER DISEASES OF THE DIGESTIVE SYSTEM: Chronic | Status: ACTIVE | Noted: 2024-06-05

## 2024-07-02 PROBLEM — D64.9 ANEMIA, UNSPECIFIED: Chronic | Status: ACTIVE | Noted: 2024-06-05

## 2024-07-05 ENCOUNTER — APPOINTMENT (OUTPATIENT)
Dept: OBGYN | Facility: CLINIC | Age: 39
End: 2024-07-05

## 2024-07-05 VITALS
WEIGHT: 200 LBS | DIASTOLIC BLOOD PRESSURE: 64 MMHG | HEIGHT: 67 IN | SYSTOLIC BLOOD PRESSURE: 102 MMHG | BODY MASS INDEX: 31.39 KG/M2 | HEART RATE: 72 BPM

## 2024-07-05 PROCEDURE — 99024 POSTOP FOLLOW-UP VISIT: CPT

## 2024-11-19 ENCOUNTER — APPOINTMENT (OUTPATIENT)
Dept: OBGYN | Facility: CLINIC | Age: 39
End: 2024-11-19
Payer: COMMERCIAL

## 2024-11-19 ENCOUNTER — ASOB RESULT (OUTPATIENT)
Age: 39
End: 2024-11-19

## 2024-11-19 VITALS
HEART RATE: 72 BPM | HEIGHT: 67 IN | SYSTOLIC BLOOD PRESSURE: 117 MMHG | WEIGHT: 186 LBS | RESPIRATION RATE: 16 BRPM | BODY MASS INDEX: 29.19 KG/M2 | DIASTOLIC BLOOD PRESSURE: 79 MMHG

## 2024-11-19 DIAGNOSIS — R92.30 DENSE BREASTS, UNSPECIFIED: ICD-10-CM

## 2024-11-19 DIAGNOSIS — Z12.39 ENCOUNTER FOR OTHER SCREENING FOR MALIGNANT NEOPLASM OF BREAST: ICD-10-CM

## 2024-11-19 DIAGNOSIS — Z01.419 ENCOUNTER FOR GYNECOLOGICAL EXAMINATION (GENERAL) (ROUTINE) W/OUT ABNORMAL FINDINGS: ICD-10-CM

## 2024-11-19 PROCEDURE — 76856 US EXAM PELVIC COMPLETE: CPT | Mod: 59

## 2024-11-19 PROCEDURE — 93975 VASCULAR STUDY: CPT

## 2024-11-19 PROCEDURE — 99213 OFFICE O/P EST LOW 20 MIN: CPT

## 2024-11-19 PROCEDURE — 76830 TRANSVAGINAL US NON-OB: CPT

## 2024-11-19 PROCEDURE — 99459 PELVIC EXAMINATION: CPT

## 2025-02-10 ENCOUNTER — APPOINTMENT (OUTPATIENT)
Dept: OBGYN | Facility: CLINIC | Age: 40
End: 2025-02-10
Payer: COMMERCIAL

## 2025-02-10 ENCOUNTER — ASOB RESULT (OUTPATIENT)
Age: 40
End: 2025-02-10

## 2025-02-10 VITALS
BODY MASS INDEX: 29.19 KG/M2 | HEART RATE: 73 BPM | DIASTOLIC BLOOD PRESSURE: 75 MMHG | HEIGHT: 67 IN | SYSTOLIC BLOOD PRESSURE: 112 MMHG | WEIGHT: 186 LBS

## 2025-02-10 DIAGNOSIS — N83.201 UNSPECIFIED OVARIAN CYST, RIGHT SIDE: ICD-10-CM

## 2025-02-10 DIAGNOSIS — Z11.51 ENCOUNTER FOR SCREENING FOR HUMAN PAPILLOMAVIRUS (HPV): ICD-10-CM

## 2025-02-10 DIAGNOSIS — Z12.4 ENCOUNTER FOR SCREENING FOR MALIGNANT NEOPLASM OF CERVIX: ICD-10-CM

## 2025-02-10 DIAGNOSIS — Z12.39 ENCOUNTER FOR OTHER SCREENING FOR MALIGNANT NEOPLASM OF BREAST: ICD-10-CM

## 2025-02-10 DIAGNOSIS — N83.202 UNSPECIFIED OVARIAN CYST, RIGHT SIDE: ICD-10-CM

## 2025-02-10 PROCEDURE — 76830 TRANSVAGINAL US NON-OB: CPT

## 2025-02-10 PROCEDURE — 99213 OFFICE O/P EST LOW 20 MIN: CPT

## 2025-02-10 PROCEDURE — 76856 US EXAM PELVIC COMPLETE: CPT | Mod: 59

## 2025-03-04 NOTE — ASU PREOP CHECKLIST - LOOSE TEETH
Please follow up with your primary care provider or [facility] regarding your elevated hgb A1C and elevated heart rate throughout your hospital admission.  no

## (undated) DEVICE — PACK LITHOTOMY

## (undated) DEVICE — WARMING BLANKET UPPER ADULT

## (undated) DEVICE — DRAPE LIGHT HANDLE COVER (BLUE)

## (undated) DEVICE — TUBING SUCTION NONCONDUCTIVE 6MM X 12FT

## (undated) DEVICE — GLV 8.5 PROTEXIS (WHITE)

## (undated) DEVICE — SUT VICRYL 2-0 27" SH UNDYED

## (undated) DEVICE — DRAPE UNDER BUTTOCKS W FLUID CONTROL POUCH

## (undated) DEVICE — URETERAL CATH RED RUBBER 16FR (ORANGE)

## (undated) DEVICE — BLADE SURGICAL #15 CARBON

## (undated) DEVICE — TUBING IV EXTENSION MACRO 2Y-CLAVE 32"

## (undated) DEVICE — SUT VICRYL 3-0 27" SH UNDYED

## (undated) DEVICE — SOL IRR POUR NS 0.9% 1000ML

## (undated) DEVICE — DRAPE INSTRUMENT POUCH 6.75" X 11"

## (undated) DEVICE — GLV 7 PROTEXIS (BLUE)

## (undated) DEVICE — NDL HYPO REGULAR BEVEL 25G X 1.5" (BLUE)

## (undated) DEVICE — GOWN XL

## (undated) DEVICE — SOL IRR POUR H2O 1000ML

## (undated) DEVICE — PREP TRAY DRY SKIN PREP SCRUB

## (undated) DEVICE — CANISTER SUCTION 2000CC

## (undated) DEVICE — DRSG TELFA 3 X 4

## (undated) DEVICE — PRESSURE INFUSOR BAG 1000ML

## (undated) DEVICE — DRAPE XL SHEET 77X98"

## (undated) DEVICE — DRAPE 3/4 SHEET 52X76"

## (undated) DEVICE — ELCTR BOVIE TIP BLADE INSULATED 2.75" EDGE

## (undated) DEVICE — GLV 6.5 PROTEXIS (WHITE)

## (undated) DEVICE — ELCTR BOVIE TIP BLADE INSULATED 6.5" EDGE

## (undated) DEVICE — GOWN LG

## (undated) DEVICE — TUBING IRR SET FOR CYSTOSCOPY 77"

## (undated) DEVICE — ELCTR GROUNDING PAD ADULT COVIDIEN

## (undated) DEVICE — PACK MINOR WITH LAP

## (undated) DEVICE — VENODYNE/SCD SLEEVE CALF MEDIUM

## (undated) DEVICE — SOL BAG NS 0.9% 1000ML

## (undated) DEVICE — TUBING CONNECTING 6MM 20FT

## (undated) DEVICE — PREP DYNA-HEX CHG 4% 4OZ BOTTLE (BACTOSHIELD)

## (undated) DEVICE — DRAPE TOWEL BLUE 17" X 24"

## (undated) DEVICE — SUCTION YANKAUER TAPERED BULBOUS NO VENT

## (undated) DEVICE — DRSG XEROFORM 5 X 9"

## (undated) DEVICE — DRAPE 1/2 SHEET 40X57"

## (undated) DEVICE — ELCTR BOVIE PENCIL HANDPIECE ROCKER SWITCH 15FT

## (undated) DEVICE — DRAPE LIGHT HANDLE COVER (GREEN)

## (undated) DEVICE — FOLEY TRAY 16FR 5CC LF UMETER CLOSED

## (undated) DEVICE — DRAPE LEGGINGS XL

## (undated) DEVICE — LAP PAD W RING 18 X 18"

## (undated) DEVICE — SUT SILK 2-0 18" FS